# Patient Record
Sex: MALE | Race: OTHER | HISPANIC OR LATINO | Employment: UNEMPLOYED | URBAN - METROPOLITAN AREA
[De-identification: names, ages, dates, MRNs, and addresses within clinical notes are randomized per-mention and may not be internally consistent; named-entity substitution may affect disease eponyms.]

---

## 2021-09-14 ENCOUNTER — HOSPITAL ENCOUNTER (EMERGENCY)
Facility: HOSPITAL | Age: 46
Discharge: HOME | End: 2021-09-14
Attending: EMERGENCY MEDICINE
Payer: COMMERCIAL

## 2021-09-14 VITALS
DIASTOLIC BLOOD PRESSURE: 83 MMHG | HEART RATE: 64 BPM | RESPIRATION RATE: 15 BRPM | SYSTOLIC BLOOD PRESSURE: 136 MMHG | WEIGHT: 142.42 LBS | OXYGEN SATURATION: 99 %

## 2021-09-14 DIAGNOSIS — K08.89 PAIN, DENTAL: Primary | ICD-10-CM

## 2021-09-14 PROCEDURE — 99283 EMERGENCY DEPT VISIT LOW MDM: CPT

## 2021-09-14 PROCEDURE — 99284 EMERGENCY DEPT VISIT MOD MDM: CPT

## 2021-09-14 RX ORDER — ACETAMINOPHEN 325 MG/1
650 TABLET ORAL ONCE
Status: COMPLETED | OUTPATIENT
Start: 2021-09-14 | End: 2021-09-14

## 2021-09-14 RX ORDER — SENNOSIDES 8.6 MG
650 CAPSULE ORAL EVERY 8 HOURS PRN
Qty: 30 TABLET | Refills: 0 | Status: SHIPPED | OUTPATIENT
Start: 2021-09-14

## 2021-09-14 RX ORDER — LIDOCAINE HYDROCHLORIDE 20 MG/ML
15 SOLUTION OROPHARYNGEAL 4 TIMES DAILY PRN
Qty: 100 ML | Refills: 0 | Status: SHIPPED | OUTPATIENT
Start: 2021-09-14

## 2021-09-14 RX ADMIN — ACETAMINOPHEN 650 MG: 325 TABLET ORAL at 15:42

## 2021-09-14 NOTE — ED PROVIDER NOTES
History  Chief Complaint   Patient presents with    Dental Pain     left lower dental pain, seens at dentist needs extraction  requesting something for pain  40 y o  M with no PMH presents to ED c/o tooth pain x 1 year  He states he saw the dentist this morning who told him it needs to be extracted  He says he is waiting for the oral surgeon to call him for an appointment  He states the pain is 10/10  Unchanged for the past year  He is still able to eat and drink normally  Denies F, chills, purulent discharge, facial swelling, gum pain, HA, CP, SOB  History provided by:  Patient   used: No    Dental Pain  Progression:  Unchanged  Context: poor dentition    Context: not abscess    Previous work-up:  Dental exam (Seen by dentist this morning, told tooth needs to be extracted  Was told that oral surgeon would call him to set up appointment  )  Relieved by:  None tried  Worsened by:  Touching  Ineffective treatments:  None tried  Associated symptoms: no congestion, no difficulty swallowing, no drooling, no facial pain, no facial swelling, no fever, no gum swelling, no headaches, no neck pain, no neck swelling, no oral bleeding, no oral lesions and no trismus        None       History reviewed  No pertinent past medical history  History reviewed  No pertinent surgical history  History reviewed  No pertinent family history  I have reviewed and agree with the history as documented  E-Cigarette/Vaping     E-Cigarette/Vaping Substances     Social History     Tobacco Use    Smoking status: Never Smoker    Smokeless tobacco: Never Used   Substance Use Topics    Alcohol use: Not Currently    Drug use: Not Currently       Review of Systems   Constitutional: Negative for appetite change, chills, diaphoresis and fever  HENT: Positive for dental problem   Negative for congestion, drooling, ear pain, facial swelling, mouth sores, rhinorrhea, sinus pain, sore throat and trouble swallowing  Eyes: Negative for pain and visual disturbance  Respiratory: Negative for cough and shortness of breath  Cardiovascular: Negative for chest pain and palpitations  Gastrointestinal: Negative for abdominal pain, constipation, diarrhea, nausea and vomiting  Musculoskeletal: Negative for arthralgias, neck pain and neck stiffness  Skin: Negative for color change and rash  Neurological: Negative for syncope, speech difficulty, weakness, numbness and headaches  All other systems reviewed and are negative  Physical Exam  Physical Exam  Vitals and nursing note reviewed  Constitutional:       General: He is not in acute distress  Appearance: He is well-developed  He is not ill-appearing, toxic-appearing or diaphoretic  HENT:      Head: Normocephalic and atraumatic  Jaw: No trismus, tenderness, swelling or pain on movement  Nose: Nose normal       Mouth/Throat:      Lips: Pink  Mouth: Mucous membranes are moist  No oral lesions  Dentition: Abnormal dentition  Dental tenderness present  No gingival swelling, dental abscesses or gum lesions  Pharynx: No oropharyngeal exudate or posterior oropharyngeal erythema  Eyes:      Conjunctiva/sclera: Conjunctivae normal    Cardiovascular:      Rate and Rhythm: Normal rate and regular rhythm  Heart sounds: Normal heart sounds  No murmur heard  Pulmonary:      Effort: Pulmonary effort is normal  No respiratory distress  Breath sounds: Normal breath sounds  Abdominal:      Palpations: Abdomen is soft  Tenderness: There is no abdominal tenderness  Musculoskeletal:      Cervical back: Normal range of motion and neck supple  No rigidity or tenderness  Lymphadenopathy:      Cervical: No cervical adenopathy  Skin:     General: Skin is warm and dry  Neurological:      Mental Status: He is alert  Psychiatric:         Behavior: Behavior is cooperative           Vital Signs  ED Triage Vitals [09/14/21 1412]   Temp Pulse Respirations Blood Pressure SpO2   -- 64 15 136/83 99 %      Temp src Heart Rate Source Patient Position - Orthostatic VS BP Location FiO2 (%)   -- Monitor -- -- --      Pain Score       Worst Possible Pain           Vitals:    09/14/21 1412   BP: 136/83   Pulse: 64         Visual Acuity      ED Medications  Medications   acetaminophen (TYLENOL) tablet 650 mg (650 mg Oral Given 9/14/21 1542)       Diagnostic Studies  Results Reviewed     None                 No orders to display              Procedures  Procedures         ED Course                             SBIRT 20yo+      Most Recent Value   SBIRT (22 yo +)   In order to provide better care to our patients, we are screening all of our patients for alcohol and drug use  Would it be okay to ask you these screening questions? No Filed at: 09/14/2021 1514                    MDM  Number of Diagnoses or Management Options  Pain, dental  Diagnosis management comments: No signs of infection  Patient seen by dentist this morning, was told he needed tooth extraction and would be called by Oral Surgery  No antibiotics or pain meds given  Allergy to ASA  Was given Tylenol and sent home with lidocaine  Told to follow up with Oral Surgery immediately, given number for different Oral Surgeon in case he is not called for an appointment  Strict return to ED precautions discussed  Patient recommended to follow up promptly with appropriate outpatient provider  Patient  verbalizes understanding and agrees with plan  Patient is stable for discharge      Portions of the record may have been created with voice recognition software  Occasional wrong word or "sound a like" substitutions may have occurred due to the inherent limitations of voice recognition software  Read the chart carefully and recognize, using context, where substitutions have occurred        Disposition  Final diagnoses:   Pain, dental     Time reflects when diagnosis was documented in both MDM as applicable and the Disposition within this note     Time User Action Codes Description Comment    9/14/2021  3:30 PM My [facility] Add [K08 89] Pain, dental       ED Disposition     ED Disposition Condition Date/Time Comment    Discharge Stable Tue Sep 14, 2021  3:30 PM Renzo Lucianor discharge to home/self care  Follow-up Information     Follow up With Specialties Details Why [address] [facility] [place]  Schedule an appointment as soon as possible for a visit   [address] [place] [address]          Discharge Medication List as of 9/14/2021  3:34 PM      START taking these medications    Details   acetaminophen (TYLENOL) 650 mg CR tablet Take 1 tablet (650 mg total) by mouth every 8 (eight) hours as needed for mild pain or moderate pain, Starting Tue 9/14/2021, Print      Lidocaine Viscous HCl (XYLOCAINE) 2 % mucosal solution Swish and spit 15 mL 4 (four) times a day as needed for mouth pain or discomfort, Starting Tue 9/14/2021, Print           No discharge procedures on file      PDMP Review     None          ED Provider  Electronically Signed by           Bridger Owens PA-C  09/14/21 9438

## 2021-09-14 NOTE — Clinical Note
Serena Padilla was seen and treated in our emergency department on 9/14/2021  Diagnosis:     Selma Sky  may return to work on return date  He may return on this date: 09/14/2021         If you have any questions or concerns, please don't hesitate to call        Rexann Klinefelter, PA-C    ______________________________           _______________          _______________  Hospital Representative                              Date                                Time

## 2021-09-14 NOTE — DISCHARGE INSTRUCTIONS
Follow instructions from Dentist, make appointment with Oral Surgery  Use medications for pain as needed  Return to ED if symptoms worsen  Or you begin to experience signs of infection as discussed